# Patient Record
Sex: FEMALE | Race: BLACK OR AFRICAN AMERICAN | NOT HISPANIC OR LATINO | Employment: FULL TIME | URBAN - METROPOLITAN AREA
[De-identification: names, ages, dates, MRNs, and addresses within clinical notes are randomized per-mention and may not be internally consistent; named-entity substitution may affect disease eponyms.]

---

## 2020-03-02 ENCOUNTER — HOSPITAL ENCOUNTER (EMERGENCY)
Facility: HOSPITAL | Age: 40
Discharge: HOME | End: 2020-03-02
Attending: EMERGENCY MEDICINE | Admitting: EMERGENCY MEDICINE

## 2020-03-02 ENCOUNTER — APPOINTMENT (OUTPATIENT)
Dept: CT IMAGING | Facility: HOSPITAL | Age: 40
End: 2020-03-02

## 2020-03-02 VITALS
TEMPERATURE: 99.4 F | RESPIRATION RATE: 16 BRPM | DIASTOLIC BLOOD PRESSURE: 62 MMHG | OXYGEN SATURATION: 97 % | HEART RATE: 85 BPM | HEIGHT: 66 IN | BODY MASS INDEX: 20.34 KG/M2 | SYSTOLIC BLOOD PRESSURE: 106 MMHG

## 2020-03-02 DIAGNOSIS — N12 PYELONEPHRITIS: Primary | ICD-10-CM

## 2020-03-02 LAB
ALBUMIN SERPL-MCNC: 4 G/DL (ref 3.5–5.2)
ALBUMIN/GLOB SERPL: 1.6 G/DL
ALP SERPL-CCNC: 54 U/L (ref 39–117)
ALT SERPL W P-5'-P-CCNC: 15 U/L (ref 1–33)
ANION GAP SERPL CALCULATED.3IONS-SCNC: 10.9 MMOL/L (ref 5–15)
AST SERPL-CCNC: 22 U/L (ref 1–32)
B-HCG UR QL: NEGATIVE
BACTERIA UR QL AUTO: ABNORMAL /HPF
BASOPHILS # BLD AUTO: 0.02 10*3/MM3 (ref 0–0.2)
BASOPHILS NFR BLD AUTO: 0.1 % (ref 0–1.5)
BILIRUB SERPL-MCNC: 0.6 MG/DL (ref 0.2–1.2)
BILIRUB UR QL STRIP: NEGATIVE
BUN BLD-MCNC: 10 MG/DL (ref 6–20)
BUN/CREAT SERPL: 12.3 (ref 7–25)
CALCIUM SPEC-SCNC: 8.7 MG/DL (ref 8.6–10.5)
CHLORIDE SERPL-SCNC: 101 MMOL/L (ref 98–107)
CLARITY UR: ABNORMAL
CO2 SERPL-SCNC: 25.1 MMOL/L (ref 22–29)
COLOR UR: YELLOW
CREAT BLD-MCNC: 0.81 MG/DL (ref 0.57–1)
D-LACTATE SERPL-SCNC: 1.3 MMOL/L (ref 0.5–2)
DEPRECATED RDW RBC AUTO: 38 FL (ref 37–54)
EOSINOPHIL # BLD AUTO: 0 10*3/MM3 (ref 0–0.4)
EOSINOPHIL NFR BLD AUTO: 0 % (ref 0.3–6.2)
ERYTHROCYTE [DISTWIDTH] IN BLOOD BY AUTOMATED COUNT: 11.2 % (ref 12.3–15.4)
FLUAV AG NPH QL: NEGATIVE
FLUBV AG NPH QL IA: NEGATIVE
GFR SERPL CREATININE-BSD FRML MDRD: 95 ML/MIN/1.73
GLOBULIN UR ELPH-MCNC: 2.5 GM/DL
GLUCOSE BLD-MCNC: 117 MG/DL (ref 65–99)
GLUCOSE UR STRIP-MCNC: NEGATIVE MG/DL
HCT VFR BLD AUTO: 34.1 % (ref 34–46.6)
HGB BLD-MCNC: 11.3 G/DL (ref 12–15.9)
HGB UR QL STRIP.AUTO: ABNORMAL
HYALINE CASTS UR QL AUTO: ABNORMAL /LPF
IMM GRANULOCYTES # BLD AUTO: 0.08 10*3/MM3 (ref 0–0.05)
IMM GRANULOCYTES NFR BLD AUTO: 0.4 % (ref 0–0.5)
KETONES UR QL STRIP: NEGATIVE
LEUKOCYTE ESTERASE UR QL STRIP.AUTO: ABNORMAL
LYMPHOCYTES # BLD AUTO: 0.85 10*3/MM3 (ref 0.7–3.1)
LYMPHOCYTES NFR BLD AUTO: 4.5 % (ref 19.6–45.3)
MCH RBC QN AUTO: 30.9 PG (ref 26.6–33)
MCHC RBC AUTO-ENTMCNC: 33.1 G/DL (ref 31.5–35.7)
MCV RBC AUTO: 93.2 FL (ref 79–97)
MONOCYTES # BLD AUTO: 1.34 10*3/MM3 (ref 0.1–0.9)
MONOCYTES NFR BLD AUTO: 7.1 % (ref 5–12)
NEUTROPHILS # BLD AUTO: 16.63 10*3/MM3 (ref 1.7–7)
NEUTROPHILS NFR BLD AUTO: 87.9 % (ref 42.7–76)
NITRITE UR QL STRIP: POSITIVE
NRBC BLD AUTO-RTO: 0 /100 WBC (ref 0–0.2)
PH UR STRIP.AUTO: 7 [PH] (ref 5–8)
PLATELET # BLD AUTO: 203 10*3/MM3 (ref 140–450)
PMV BLD AUTO: 10 FL (ref 6–12)
POTASSIUM BLD-SCNC: 3.9 MMOL/L (ref 3.5–5.2)
PROT SERPL-MCNC: 6.5 G/DL (ref 6–8.5)
PROT UR QL STRIP: ABNORMAL
RBC # BLD AUTO: 3.66 10*6/MM3 (ref 3.77–5.28)
RBC # UR: ABNORMAL /HPF
REF LAB TEST METHOD: ABNORMAL
SODIUM BLD-SCNC: 137 MMOL/L (ref 136–145)
SP GR UR STRIP: 1.02 (ref 1–1.03)
SQUAMOUS #/AREA URNS HPF: ABNORMAL /HPF
UROBILINOGEN UR QL STRIP: ABNORMAL
WBC NRBC COR # BLD: 18.92 10*3/MM3 (ref 3.4–10.8)
WBC UR QL AUTO: ABNORMAL /HPF

## 2020-03-02 PROCEDURE — 87088 URINE BACTERIA CULTURE: CPT | Performed by: NURSE PRACTITIONER

## 2020-03-02 PROCEDURE — 96365 THER/PROPH/DIAG IV INF INIT: CPT

## 2020-03-02 PROCEDURE — 74176 CT ABD & PELVIS W/O CONTRAST: CPT

## 2020-03-02 PROCEDURE — 87040 BLOOD CULTURE FOR BACTERIA: CPT | Performed by: NURSE PRACTITIONER

## 2020-03-02 PROCEDURE — 96361 HYDRATE IV INFUSION ADD-ON: CPT

## 2020-03-02 PROCEDURE — 80053 COMPREHEN METABOLIC PANEL: CPT | Performed by: NURSE PRACTITIONER

## 2020-03-02 PROCEDURE — 87804 INFLUENZA ASSAY W/OPTIC: CPT | Performed by: NURSE PRACTITIONER

## 2020-03-02 PROCEDURE — 87186 SC STD MICRODIL/AGAR DIL: CPT | Performed by: NURSE PRACTITIONER

## 2020-03-02 PROCEDURE — 96375 TX/PRO/DX INJ NEW DRUG ADDON: CPT

## 2020-03-02 PROCEDURE — 83605 ASSAY OF LACTIC ACID: CPT | Performed by: NURSE PRACTITIONER

## 2020-03-02 PROCEDURE — 36415 COLL VENOUS BLD VENIPUNCTURE: CPT

## 2020-03-02 PROCEDURE — 85025 COMPLETE CBC W/AUTO DIFF WBC: CPT | Performed by: NURSE PRACTITIONER

## 2020-03-02 PROCEDURE — 81025 URINE PREGNANCY TEST: CPT | Performed by: NURSE PRACTITIONER

## 2020-03-02 PROCEDURE — 25010000002 ONDANSETRON PER 1 MG: Performed by: NURSE PRACTITIONER

## 2020-03-02 PROCEDURE — 81001 URINALYSIS AUTO W/SCOPE: CPT | Performed by: NURSE PRACTITIONER

## 2020-03-02 PROCEDURE — 25010000002 MORPHINE PER 10 MG: Performed by: NURSE PRACTITIONER

## 2020-03-02 PROCEDURE — 99284 EMERGENCY DEPT VISIT MOD MDM: CPT

## 2020-03-02 PROCEDURE — 87086 URINE CULTURE/COLONY COUNT: CPT | Performed by: NURSE PRACTITIONER

## 2020-03-02 PROCEDURE — 25010000002 CEFTRIAXONE PER 250 MG: Performed by: EMERGENCY MEDICINE

## 2020-03-02 RX ORDER — PHENAZOPYRIDINE HYDROCHLORIDE 200 MG/1
TABLET, FILM COATED ORAL
Qty: 9 TABLET | Refills: 0 | Status: SHIPPED | OUTPATIENT
Start: 2020-03-02

## 2020-03-02 RX ORDER — MORPHINE SULFATE 2 MG/ML
2 INJECTION, SOLUTION INTRAMUSCULAR; INTRAVENOUS ONCE
Status: COMPLETED | OUTPATIENT
Start: 2020-03-02 | End: 2020-03-02

## 2020-03-02 RX ORDER — PROMETHAZINE HYDROCHLORIDE 25 MG/1
TABLET ORAL
Qty: 30 TABLET | Refills: 0 | Status: SHIPPED | OUTPATIENT
Start: 2020-03-02

## 2020-03-02 RX ORDER — ACETAMINOPHEN 500 MG
1000 TABLET ORAL ONCE
Status: COMPLETED | OUTPATIENT
Start: 2020-03-02 | End: 2020-03-02

## 2020-03-02 RX ORDER — CEFTRIAXONE SODIUM 1 G/50ML
1 INJECTION, SOLUTION INTRAVENOUS ONCE
Status: COMPLETED | OUTPATIENT
Start: 2020-03-02 | End: 2020-03-02

## 2020-03-02 RX ORDER — ONDANSETRON 4 MG/1
TABLET, ORALLY DISINTEGRATING ORAL
Qty: 20 TABLET | Refills: 0 | Status: SHIPPED | OUTPATIENT
Start: 2020-03-02

## 2020-03-02 RX ORDER — SODIUM CHLORIDE 0.9 % (FLUSH) 0.9 %
10 SYRINGE (ML) INJECTION AS NEEDED
Status: DISCONTINUED | OUTPATIENT
Start: 2020-03-02 | End: 2020-03-02 | Stop reason: HOSPADM

## 2020-03-02 RX ORDER — ONDANSETRON 2 MG/ML
4 INJECTION INTRAMUSCULAR; INTRAVENOUS ONCE
Status: COMPLETED | OUTPATIENT
Start: 2020-03-02 | End: 2020-03-02

## 2020-03-02 RX ORDER — CEFUROXIME AXETIL 500 MG/1
500 TABLET ORAL 2 TIMES DAILY
Qty: 14 TABLET | Refills: 0 | Status: SHIPPED | OUTPATIENT
Start: 2020-03-02

## 2020-03-02 RX ORDER — SODIUM CHLORIDE 9 MG/ML
125 INJECTION, SOLUTION INTRAVENOUS CONTINUOUS
Status: DISCONTINUED | OUTPATIENT
Start: 2020-03-02 | End: 2020-03-02 | Stop reason: HOSPADM

## 2020-03-02 RX ADMIN — SODIUM CHLORIDE 125 ML/HR: 9 INJECTION, SOLUTION INTRAVENOUS at 12:00

## 2020-03-02 RX ADMIN — SODIUM CHLORIDE 1000 ML: 9 INJECTION, SOLUTION INTRAVENOUS at 13:13

## 2020-03-02 RX ADMIN — CEFTRIAXONE SODIUM 1 G: 1 INJECTION, SOLUTION INTRAVENOUS at 13:12

## 2020-03-02 RX ADMIN — ACETAMINOPHEN 1000 MG: 500 TABLET, FILM COATED ORAL at 13:13

## 2020-03-02 RX ADMIN — MORPHINE SULFATE 2 MG: 2 INJECTION, SOLUTION INTRAMUSCULAR; INTRAVENOUS at 11:57

## 2020-03-02 RX ADMIN — ONDANSETRON 4 MG: 2 INJECTION INTRAMUSCULAR; INTRAVENOUS at 11:57

## 2020-03-02 NOTE — ED TRIAGE NOTES
Pt states that her left flank started hurting yesterday morning.   States that the pain went away for a little bit, but woke up this am and states the pain is worse.   States that she has had this pain in the past, denies hx of kidney stones.

## 2020-03-02 NOTE — ED PROVIDER NOTES
EMERGENCY DEPARTMENT ENCOUNTER    Room number:  01/01  Date Seen:  3/2/2020  Time of transfer:1200  PCP:  Provider, No Known    HPI  Pt presents to the ED c/o constant L flank pain that began yesterday. She reports pain is 8/10 in severity and denies radiatio. Pt reports pain was initially resolved with ibuprofen but then returned more severely overnight. Pt is also c/o nausea, increased urinary frequency and L sided abd pain but denies vomiting. She reports similar pain previously when she had pyelonephritis. Pt denies recently traveling out of the country.     PHYSICAL EXAM  Constitutional: Awake, alert, in NAD  Cardiovascular: Heart regular rate and rhythm  Respiratory: Lungs CTAB, no respiratory distress  Abdomen: Tenderness in LLQ, soft, non distended   MSK: L CVA tenderness, atraumatic external back exam    MEDICAL RECORD REVIEW  Pt had urine culture in 2016 that showed Ecoli that was pan sensitive    LABORATORY RESULTS:  Recent Results (from the past 24 hour(s))   Pregnancy, Urine - Urine, Clean Catch    Collection Time: 03/02/20 11:52 AM   Result Value Ref Range    HCG, Urine QL Negative Negative   Urinalysis With Culture If Indicated - Urine, Clean Catch    Collection Time: 03/02/20 11:52 AM   Result Value Ref Range    Color, UA Yellow Yellow, Straw    Appearance, UA Cloudy (A) Clear    pH, UA 7.0 5.0 - 8.0    Specific Gravity, UA 1.017 1.005 - 1.030    Glucose, UA Negative Negative    Ketones, UA Negative Negative    Bilirubin, UA Negative Negative    Blood, UA Moderate (2+) (A) Negative    Protein,  mg/dL (2+) (A) Negative    Leuk Esterase, UA Moderate (2+) (A) Negative    Nitrite, UA Positive (A) Negative    Urobilinogen, UA 1.0 E.U./dL 0.2 - 1.0 E.U./dL   Urinalysis, Microscopic Only - Urine, Clean Catch    Collection Time: 03/02/20 11:52 AM   Result Value Ref Range    RBC, UA 31-50 (A) None Seen, 0-2 /HPF    WBC, UA 31-50 (A) None Seen, 0-2 /HPF    Bacteria, UA 4+ (A) None Seen /HPF    Squamous  Epithelial Cells, UA 0-2 None Seen, 0-2 /HPF    Hyaline Casts, UA 7-12 None Seen /LPF    Methodology Automated Microscopy    Comprehensive Metabolic Panel    Collection Time: 03/02/20 12:01 PM   Result Value Ref Range    Glucose 117 (H) 65 - 99 mg/dL    BUN 10 6 - 20 mg/dL    Creatinine 0.81 0.57 - 1.00 mg/dL    Sodium 137 136 - 145 mmol/L    Potassium 3.9 3.5 - 5.2 mmol/L    Chloride 101 98 - 107 mmol/L    CO2 25.1 22.0 - 29.0 mmol/L    Calcium 8.7 8.6 - 10.5 mg/dL    Total Protein 6.5 6.0 - 8.5 g/dL    Albumin 4.00 3.50 - 5.20 g/dL    ALT (SGPT) 15 1 - 33 U/L    AST (SGOT) 22 1 - 32 U/L    Alkaline Phosphatase 54 39 - 117 U/L    Total Bilirubin 0.6 0.2 - 1.2 mg/dL    eGFR  African Amer 95 >60 mL/min/1.73    Globulin 2.5 gm/dL    A/G Ratio 1.6 g/dL    BUN/Creatinine Ratio 12.3 7.0 - 25.0    Anion Gap 10.9 5.0 - 15.0 mmol/L   Lactic Acid, Plasma    Collection Time: 03/02/20 12:01 PM   Result Value Ref Range    Lactate 1.3 0.5 - 2.0 mmol/L   CBC Auto Differential    Collection Time: 03/02/20 12:01 PM   Result Value Ref Range    WBC 18.92 (H) 3.40 - 10.80 10*3/mm3    RBC 3.66 (L) 3.77 - 5.28 10*6/mm3    Hemoglobin 11.3 (L) 12.0 - 15.9 g/dL    Hematocrit 34.1 34.0 - 46.6 %    MCV 93.2 79.0 - 97.0 fL    MCH 30.9 26.6 - 33.0 pg    MCHC 33.1 31.5 - 35.7 g/dL    RDW 11.2 (L) 12.3 - 15.4 %    RDW-SD 38.0 37.0 - 54.0 fl    MPV 10.0 6.0 - 12.0 fL    Platelets 203 140 - 450 10*3/mm3    Neutrophil % 87.9 (H) 42.7 - 76.0 %    Lymphocyte % 4.5 (L) 19.6 - 45.3 %    Monocyte % 7.1 5.0 - 12.0 %    Eosinophil % 0.0 (L) 0.3 - 6.2 %    Basophil % 0.1 0.0 - 1.5 %    Immature Grans % 0.4 0.0 - 0.5 %    Neutrophils, Absolute 16.63 (H) 1.70 - 7.00 10*3/mm3    Lymphocytes, Absolute 0.85 0.70 - 3.10 10*3/mm3    Monocytes, Absolute 1.34 (H) 0.10 - 0.90 10*3/mm3    Eosinophils, Absolute 0.00 0.00 - 0.40 10*3/mm3    Basophils, Absolute 0.02 0.00 - 0.20 10*3/mm3    Immature Grans, Absolute 0.08 (H) 0.00 - 0.05 10*3/mm3    nRBC 0.0 0.0 - 0.2 /100  WBC   Influenza Antigen, Rapid - Swab, Nasopharynx    Collection Time: 03/02/20 12:02 PM   Result Value Ref Range    Influenza A Ag, EIA Negative Negative    Influenza B Ag, EIA Negative Negative     I reviewed the above results.     RADIOLOGY:  CT Abdomen Pelvis Without Contrast   Preliminary Result   Left pyelonephritis is suspected. There are no renal or   ureteral stones and there is no hydronephrosis bilaterally.       Discussed with Dr. Kaye.            I reviewed the above results    MEDICATIONS ORDERED IN THE ED:  Medications   sodium chloride 0.9 % flush 10 mL (has no administration in time range)   sodium chloride 0.9 % infusion (0 mL/hr Intravenous Stopped 3/2/20 1258)   morphine injection 2 mg (2 mg Intravenous Given 3/2/20 1157)   ondansetron (ZOFRAN) injection 4 mg (4 mg Intravenous Given 3/2/20 1157)   sodium chloride 0.9 % bolus 1,000 mL (1,000 mL Intravenous New Bag 3/2/20 1313)   acetaminophen (TYLENOL) tablet 1,000 mg (1,000 mg Oral Given 3/2/20 1313)   cefTRIAXone (ROCEPHIN) IVPB 1 g (0 g Intravenous Stopped 3/2/20 1342)       PROGRESS AND CONSULT NOTES:  1200:  Patient care transferred from [facility] pending workup completion.    1314: Spoke with  (radiology) who reports CT A/P showed stranding around L kidney indicative of pyelo versus recently passed kidney stone. No obstructive stones currently seen.     1421: Rechecked pt who is resting comfortably and in NAD. Temp 99.4F, HR 73, O2 sat 100%, /65. Informed pt of all workup results. Discussed plan to discharge with abx, anti emetics and pyridium. RTER pre cautions given. Pt understands and agrees with the plan, all questions answered.      DIAGNOSIS:  Final diagnoses:   Pyelonephritis       DISPOSITION:  DISCHARGE    Patient discharged in stable condition.    Reviewed implications of results, diagnosis, meds, responsibility to follow up, warning signs and symptoms of possible worsening, potential complications and  reasons to return to ER.    Patient/Family voiced understanding of above instructions.    Discussed plan for discharge, as there is no emergent indication for admission. Patient referred to primary care provider for BP management due to today's BP. Pt/family is agreeable and understands need for follow up and repeat testing.  Pt is aware that discharge does not mean that nothing is wrong but it indicates no emergency is present that requires admission and they must continue care with follow-up as given below or physician of their choice.     FOLLOW-UP  PATIENT LIAISON [place] [id number]  [phone]  Schedule an appointment as soon as possible for a visit   Regular health maintenance         Medication List      New Prescriptions    cefuroxime 500 MG tablet  Commonly known as:  CEFTIN  Take 1 tablet by mouth 2 (Two) Times a Day.     ondansetron ODT 4 MG disintegrating tablet  Commonly known as:  ZOFRAN ODT  Take one tablet by mouth every 6 hours as needed for nausea and vomiting     promethazine 25 MG tablet  Commonly known as:  PHENERGAN  Take one tablet by mouth every 6 hours as needed for nausea and vomiting        Changed    * AZO TABS PO  What changed:  Another medication with the same name was added. Make sure   you understand how and when to take each.     * phenazopyridine 200 MG tablet  Commonly known as:  PYRIDIUM  Take 1 tablet by mouth 3 times daily or bladder spasms  What changed:  You were already taking a medication with the same name,   and this prescription was added. Make sure you understand how and when to   take each.         * This list has 2 medication(s) that are the same as other medications   prescribed for you. Read the directions carefully, and ask your doctor or   other care provider to review them with you.                  Provider attestation:  I personally reviewed the past medical history, past surgical history, social history, family history, current medications,  and allergies as they appear in the chart.    Documentation assistance provided by noel Jones for .  Information recorded by the scribe was done at my direction and has been verified and validated by me.                         Licha Jones  03/02/20 9734       Robert Kaye MD  03/02/20 8049

## 2020-03-02 NOTE — ED PROVIDER NOTES
EMERGENCY DEPARTMENT ENCOUNTER    CHIEF COMPLAINT  Chief Complaint: left flank pain  History given by: patient, family  History limited by: nothing  Room Number: 01/01  PMD: Provider, No Known      HPI:  Pt is a 39 y.o. female who presents complaining of constant 8/10 non-radiating left flank pain that started gradually yesterday morning, resolved throughout the night and then returned more severely this morning at 0300. Patient has had this pain in the past in association with pyelonephritis. She denies a history of kidney stones. Pt reports increased frequency of urination, nausea and left-sided abdominal pain but denies urinary incontinence, dysuria, fever, chills, chest pain, SOA and all other complaints at this time. Patient is a smoker.    Duration:  One day  Onset: gradual  Timing: constant  Location: left flank  Radiation: none  Quality: pain  Intensity/Severity: moderate-to-severe  Progression: unchanged  Associated Symptoms: nausea, frequency, left abdominal pain  Aggravating Factors: none  Alleviating Factors: none  Previous Episodes: none  Treatment before arrival: none    PAST MEDICAL HISTORY  Active Ambulatory Problems     Diagnosis Date Noted   • No Active Ambulatory Problems     Resolved Ambulatory Problems     Diagnosis Date Noted   • No Resolved Ambulatory Problems     Past Medical History:   Diagnosis Date   • Pyelonephritis        PAST SURGICAL HISTORY  History reviewed. No pertinent surgical history.    FAMILY HISTORY  History reviewed. No pertinent family history.    SOCIAL HISTORY  Social History     Socioeconomic History   • Marital status: Single     Spouse name: Not on file   • Number of children: Not on file   • Years of education: Not on file   • Highest education level: Not on file   Tobacco Use   • Smoking status: Heavy Tobacco Smoker     Packs/day: 2.00     Years: 15.00     Pack years: 30.00   Substance and Sexual Activity   • Alcohol use: No   • Drug use: Yes     Comment: luis   •  Sexual activity: Defer       ALLERGIES  Patient has no known allergies.    REVIEW OF SYSTEMS  Review of Systems   Constitutional: Negative for chills and fever.   HENT: Negative for sore throat.    Eyes: Negative.    Respiratory: Negative for cough and shortness of breath.    Cardiovascular: Negative for chest pain.   Gastrointestinal: Positive for abdominal pain (left side) and nausea. Negative for diarrhea and vomiting.   Genitourinary: Positive for flank pain (left) and frequency. Negative for dysuria.   Musculoskeletal: Negative for neck pain.   Skin: Negative for rash.   Allergic/Immunologic: Negative.    Neurological: Negative for weakness, numbness and headaches.   Hematological: Negative.    Psychiatric/Behavioral: Negative.    All other systems reviewed and are negative.      PHYSICAL EXAM  ED Triage Vitals [03/02/20 1126]   Temp Heart Rate Resp BP SpO2   (!) 101.4 °F (38.6 °C) 99 18 -- 100 %      Temp src Heart Rate Source Patient Position BP Location FiO2 (%)   Tympanic Monitor -- -- --       Physical Exam   Constitutional: She is oriented to person, place, and time. No distress.   HENT:   Head: Normocephalic and atraumatic.   Eyes: Pupils are equal, round, and reactive to light. EOM are normal.   Neck: Normal range of motion. Neck supple.   Cardiovascular: Normal rate, regular rhythm and normal heart sounds.   Pulmonary/Chest: Effort normal. No respiratory distress. She has rhonchi.   Rhonchi. No distress.   Abdominal: Soft. There is tenderness in the suprapubic area, left upper quadrant and left lower quadrant. There is no rebound and no guarding.   JENNIFER, LL and suprapubic abdominal tenderness without guarding or rebound.   Musculoskeletal: Normal range of motion. She exhibits no edema.   Neurological: She is alert and oriented to person, place, and time. She has normal sensation and normal strength.   Skin: Skin is warm and dry. No rash noted.   Psychiatric: Mood and affect normal.   Nursing note and  vitals reviewed.      LAB RESULTS  Lab Results (last 24 hours)     Procedure Component Value Units Date/Time    Pregnancy, Urine - Urine, Clean Catch [[id number]]  (Normal) Collected:  03/02/20 1152    Specimen:  Urine, Clean Catch Updated:  03/02/20 1237     HCG, Urine QL Negative    Urinalysis With Culture If Indicated - Urine, Clean Catch [[id number]]  (Abnormal) Collected:  03/02/20 1152    Specimen:  Urine, Clean Catch Updated:  03/02/20 1223     Color, UA Yellow     Appearance, UA Cloudy     pH, UA 7.0     Specific Gravity, UA 1.017     Glucose, UA Negative     Ketones, UA Negative     Bilirubin, UA Negative     Blood, UA Moderate (2+)     Protein,  mg/dL (2+)     Leuk Esterase, UA Moderate (2+)     Nitrite, UA Positive     Urobilinogen, UA 1.0 E.U./dL    Urinalysis, Microscopic Only - Urine, Clean Catch [[id number]]  (Abnormal) Collected:  03/02/20 1152    Specimen:  Urine, Clean Catch Updated:  03/02/20 1223     RBC, UA 31-50 /HPF      WBC, UA 31-50 /HPF      Bacteria, UA 4+ /HPF      Squamous Epithelial Cells, UA 0-2 /HPF      Hyaline Casts, UA 7-12 /LPF      Methodology Automated Microscopy    Urine Culture - Urine, Urine, Clean Catch [[id number]] Collected:  03/02/20 1152    Specimen:  Urine, Clean Catch Updated:  03/02/20 1223    CBC & Differential [[id number]] Collected:  03/02/20 1201    Specimen:  Blood Updated:  03/02/20 1215    Narrative:       The following orders were created for panel order CBC & Differential.  Procedure                               Abnormality         Status                     ---------                               -----------         ------                     CBC Auto Differential[[id number]]         Abnormal            Final result                 Please view results for these tests on the individual orders.    Comprehensive Metabolic Panel [[id number]]  (Abnormal) Collected:  03/02/20 1201    Specimen:  Blood Updated:  03/02/20 1240     Glucose 117 mg/dL      BUN 10 mg/dL       Creatinine 0.81 mg/dL      Sodium 137 mmol/L      Potassium 3.9 mmol/L      Chloride 101 mmol/L      CO2 25.1 mmol/L      Calcium 8.7 mg/dL      Total Protein 6.5 g/dL      Albumin 4.00 g/dL      ALT (SGPT) 15 U/L      AST (SGOT) 22 U/L      Alkaline Phosphatase 54 U/L      Total Bilirubin 0.6 mg/dL      eGFR  African Amer 95 mL/min/1.73      Globulin 2.5 gm/dL      A/G Ratio 1.6 g/dL      BUN/Creatinine Ratio 12.3     Anion Gap 10.9 mmol/L     Narrative:       GFR Normal >60  Chronic Kidney Disease <60  Kidney Failure <15      Blood Culture - Blood, Blood, Venous Line [[id number]] Collected:  03/02/20 1201    Specimen:  Blood, Venous Line Updated:  03/02/20 1208    Lactic Acid, Plasma [[id number]]  (Normal) Collected:  03/02/20 1201    Specimen:  Blood Updated:  03/02/20 1230     Lactate 1.3 mmol/L     CBC Auto Differential [[id number]]  (Abnormal) Collected:  03/02/20 1201    Specimen:  Blood Updated:  03/02/20 1215     WBC 18.92 10*3/mm3      RBC 3.66 10*6/mm3      Hemoglobin 11.3 g/dL      Hematocrit 34.1 %      MCV 93.2 fL      MCH 30.9 pg      MCHC 33.1 g/dL      RDW 11.2 %      RDW-SD 38.0 fl      MPV 10.0 fL      Platelets 203 10*3/mm3      Neutrophil % 87.9 %      Lymphocyte % 4.5 %      Monocyte % 7.1 %      Eosinophil % 0.0 %      Basophil % 0.1 %      Immature Grans % 0.4 %      Neutrophils, Absolute 16.63 10*3/mm3      Lymphocytes, Absolute 0.85 10*3/mm3      Monocytes, Absolute 1.34 10*3/mm3      Eosinophils, Absolute 0.00 10*3/mm3      Basophils, Absolute 0.02 10*3/mm3      Immature Grans, Absolute 0.08 10*3/mm3      nRBC 0.0 /100 WBC     Influenza Antigen, Rapid - Swab, Nasopharynx [[id number]]  (Normal) Collected:  03/02/20 1202    Specimen:  Swab from Nasopharynx Updated:  03/02/20 1237     Influenza A Ag, EIA Negative     Influenza B Ag, EIA Negative    Blood Culture - Blood, Blood, Venous Line [[id number]] Collected:  03/02/20 1224    Specimen:  Blood, Venous Line Updated:  03/02/20 1232          I ordered  the above labs and reviewed the results.      RADIOLOGY  CT Abdomen Pelvis Without Contrast   Preliminary Result   Left pyelonephritis is suspected. There are no renal or   ureteral stones and there is no hydronephrosis bilaterally.       Discussed with Dr. Kaye.               I ordered the above noted radiological studies. Interpreted by radiologist. Reviewed by me in PACS.       PROCEDURES  Procedures      PROGRESS AND CONSULTS  ED Course as of Mar 02 1526   Mon Mar 02, 2020   1435 My differential diagnosis for abdominal pain includes but is not limited to:  Gastritis, gastroenteritis, peptic ulcer disease, GERD, esophageal perforation, acute appendicitis, mesenteric adenitis, Meckel's diverticulum, epiploic appendagitis, diverticulitis, colon cancer, ulcerative colitis, Crohn's disease, intussusception, small bowel obstruction, adhesions, ischemic bowel, perforated viscus, ileus, obstipation, biliary colic, cholecystitis, cholelithiasis, Agustin-Justen Masood, hepatitis, pancreatitis, common bile duct obstruction, cholangitis, bile leak, splenic trauma, splenic rupture, splenic infarction, splenic abscess, abdominal abscess, ascites, spontaneous bacterial peritonitis, hernia, UTI, cystitis,ureterolithiasis, urinary obstruction, ovarian cyst, torsion, pregnancy, ectopic pregnancy, PID, pelvic abscess, mittelschmerz, endometriosis, AAA, myocardial infarction, pneumonia, cancer, porphyria, DKA, medications, sickle cell, viral syndrome, zoster      [RS]      ED Course User Index  [RS] Robert Kaye MD     1145. Labs ordered. Ordered morphine with Zofran for pain.     1200. Discussed case with Dr Robert Kaye. After a bedside evaluation, he agrees with the plan of care.    1210. Care turned over to Dr Robert Kaye pending workup and disposition           MEDICAL DECISION MAKING  Results were reviewed/discussed with the patient and they were also made aware of online access. Pt also made aware that some labs, such as  cultures, will not be resulted during ER visit and follow up with PMD is necessary.     MDM  Number of Diagnoses or Management Options  Pyelonephritis:      Amount and/or Complexity of Data Reviewed  Clinical lab tests: ordered and reviewed (WBC 18)  Tests in the radiology section of CPT®: ordered and reviewed (Nothing acute on CT abd/pelvis)  Decide to obtain previous medical records or to obtain history from someone other than the patient: yes (Epic)  Review and summarize past medical records: yes (1/22/19 seen for abdominal pain without fever, dx UTI. History of pyelonephritis)    Patient Progress  Patient progress: stable         DISPOSITION    TURNOVER TO DR DARRIN RIDDLE      Latest Documented Vital Signs:  As of 3:26 PM  BP- 106/62 HR- 85 Temp- 99.4 °F (37.4 °C) (Oral) O2 sat- 97%    --  Documentation assistance provided by noel Ramos for ROLY Balderas.  Information recorded by the scribe was done at my direction and has been verified and validated by me.     Roxanna Ramos  03/02/20 [id number]       Jodi Delacruz APRN  03/06/20 [id number]

## 2020-03-04 LAB — BACTERIA SPEC AEROBE CULT: ABNORMAL

## 2020-03-05 LAB
BACTERIA SPEC AEROBE CULT: ABNORMAL
BACTERIA SPEC AEROBE CULT: ABNORMAL
GRAM STN SPEC: ABNORMAL
ISOLATED FROM: ABNORMAL
ISOLATED FROM: ABNORMAL

## 2021-08-12 ENCOUNTER — TRANSCRIBE ORDERS (OUTPATIENT)
Dept: ADMINISTRATIVE | Facility: HOSPITAL | Age: 41
End: 2021-08-12

## 2021-08-12 DIAGNOSIS — Z12.31 BREAST CANCER SCREENING BY MAMMOGRAM: Primary | ICD-10-CM

## 2021-08-12 DIAGNOSIS — Z12.31 VISIT FOR SCREENING MAMMOGRAM: Primary | ICD-10-CM
